# Patient Record
Sex: FEMALE | Race: WHITE | Employment: UNEMPLOYED | URBAN - METROPOLITAN AREA
[De-identification: names, ages, dates, MRNs, and addresses within clinical notes are randomized per-mention and may not be internally consistent; named-entity substitution may affect disease eponyms.]

---

## 2018-05-17 ENCOUNTER — TELEPHONE (OUTPATIENT)
Dept: GYNECOLOGY | Age: 33
End: 2018-05-17

## 2018-05-17 DIAGNOSIS — C56.9 MALIGNANT NEOPLASM OF OVARY, UNSPECIFIED LATERALITY (HCC): Primary | ICD-10-CM

## 2018-05-21 NOTE — TELEPHONE ENCOUNTER
Called pt's mother back, ok to speak to her per RODRIGUEZ on file, left message with 3rd party to have mother call me back, need to know where to send lab slip

## 2018-09-19 ENCOUNTER — TELEPHONE (OUTPATIENT)
Dept: GYNECOLOGY | Age: 33
End: 2018-09-19

## 2018-09-19 NOTE — LETTER
9/27/2018 11:00 AM 
 
Ms. Lauro Horne [address] [address] Lauro Horne is currently under the care of [facility]. Please send a copy of her Inhibin B results to fax number [phone]. If you have any questions or concerns please call our office at [phone]. Sincerely, Erlin Marley MD

## 2018-09-27 NOTE — TELEPHONE ENCOUNTER
Mom states we have to send a letter requesting results be sent to us, attn :  Marbella Kelsey at [phone]

## 2020-07-30 ENCOUNTER — TELEPHONE (OUTPATIENT)
Dept: GYNECOLOGY | Age: 35
End: 2020-07-30

## 2020-07-31 DIAGNOSIS — C56.9 MALIGNANT NEOPLASM OF OVARY, UNSPECIFIED LATERALITY (HCC): Primary | ICD-10-CM

## 2020-07-31 NOTE — TELEPHONE ENCOUNTER
I spoke with patient's mom, pt is living in [facility] and will be transferring from our practice. I will fax over a release form to [email or web address]. We will order the inihibin and then she will establish new care in [facility].

## 2020-08-18 LAB — INHIBIN B SERPL-MCNC: 157.7 PG/ML
